# Patient Record
Sex: MALE | Race: BLACK OR AFRICAN AMERICAN
[De-identification: names, ages, dates, MRNs, and addresses within clinical notes are randomized per-mention and may not be internally consistent; named-entity substitution may affect disease eponyms.]

---

## 2017-10-09 ENCOUNTER — HOSPITAL ENCOUNTER (OUTPATIENT)
Dept: PHYSICAL THERAPY | Facility: CLINIC | Age: 52
Setting detail: THERAPIES SERIES
Discharge: HOME | End: 2017-10-09
Payer: COMMERCIAL

## 2017-10-09 PROCEDURE — G0283 ELEC STIM OTHER THAN WOUND: HCPCS

## 2017-10-09 PROCEDURE — 97110 THERAPEUTIC EXERCISES: CPT

## 2017-10-09 PROCEDURE — 97162 PT EVAL MOD COMPLEX 30 MIN: CPT

## 2017-10-09 NOTE — CONSULTS
[] Ginna Levine        Outpatient Physical                Therapy       [address]       Phone: [phone]       Fax: [phone] [x] [facility]       Promotion at [address] [facility]       Phone: [phone]       Fax: [phone] [] Savi Garcia      for Health Promotion    [address]     Phone: [phone]     Fax:  [phone]     Physical Therapy Spine Evaluation    Date:  10/9/2017  Patient: Sergio Leaver  : 1965  MRN: [id number]  Physician: Reilly Case MD  Insurance: [facility]  Medical Diagnosis: cervical,  Thoracic and lumbar strain    Rehab Codes: M54.2, M54.5  Onset Date:  (Lumbar); 3 months ago (cervical)  Next 's appt.: as needed     Subjective:   CC:Pt c/o constant LBP that he rates 3/10; he reports he is currently off work due to the injury. He notes his job requires frequent lifting. HPI: (onset date): Pt is a 45 y.o. Male who reports a 3 year history of constant Lumbar pain; he reports a 3 month history of cervical pain but notes this is intermittent.      PMHx: [] Unremarkable [] Diabetes [x] HTN  [] Pacemaker   [] MI/Heart Problems [] Cancer [] Arthritis [] Other:              [x] Refer to full medical chart  In EPIC   Tests: [] X-Ray: [] MRI:  [] Other:    Medications: [x] Refer to full medical record [] None [] Other:  Allergies:      [x] Refer to full medical record [] None [] Other:    Function:  Hand Dominance  [x] Right  [] Left  Working:  [] Normal Duty  [] Light Duty  [x] Off D/T Condition  [] Retired  [] Not Employed                  []  Disability  [] Other:           Return to work:2017   Job/ADL Description:[employer]  Pain:  [x] Yes  [] No Location: see above Pain Rating: (0-10 scale)  2-3/10  Pain altered Tx:  [] Yes  [] No  Action:  Symptoms:  [x] Improving [] Worsening [] Same  Better:  [] AM    [] PM    [] Sit    [] Rise/Sit    []Stand    [] Walk    [] Lying    [] Other:  Worse: [] AM    [] PM    [] Sit    [] Rise/Sit    [x]Stand    [] Walk    [] Lying    [] Bend                             [] Valsalva    [] Other:  Sleep: [x] OK    [] Disturbed    Objective:      STRENGTH  STRENGTH  ROM    Left Right  Left Right Cervical    C5 Shld Abd   L1-2 Hip Flex 4 5 Flexion Min ? Shld Flexion   Hip Abd 4 5 Extension Min ? Shld IR   L3-4 Knee Ext 4+ 5 Rotation L 25 R 40   Shld ER   L4 Ankle DF 5 5 Sidebend L 25 R 25   C6 Elb Flex   L5 EHL 5 5 Retraction    C7 Elb Ext   S1 Plant. Flex   Lumbar    C8 EPL   Abdominals   Flexion 40° dev to L   T1 Fing Abd   Erector Spinae   Extension 20°         Rotation L  R         Sidebend L 20 R 20         UE/LE                                                                  TESTS (+/-) LEFT RIGHT Not Tested   SLR [] sit [x] supine Neg Neg []   Hamstring (SLR)   []   SKTC   []   DKTC   []   Slump/Dural Neg Neg []   SI JT   []   AV   []   Joint Mobility   []   Cerv. Comp   []   Cerv. Distraction   []   Cerv. Alar/Transverse   []   Vertebral Artery   []   Adsons   []   Fayetta Pipe   []   Leticia Tests ? Pain ?  Pain No Change Not Tested   RFIS [] [] [] [x]   ROSA [] [] [] [x]   RFIL [] [] [] [x]   REIL [] [] [] [x]   Rep Prot [] [] [] []   Rep Retract [] [] [] []       OBSERVATION No Deficit Deficit Not Tested Comments   Posture       Forward Head [] [] []    Rounded Shoulders [] [x] []    Kyphosis [] [] []    Lordosis [] [] []    Lateral Shift [] [x] [] Deviates to Left in flexion   Scoliosis [] [] []    Iliac Crest [] [] []    PSIS [] [] []    ASIS [] [] []    Genu Valgus [] [] []    Genu Varus [] [] []    Genu Recurvatum [] [] []    Pronation [] [] []    Supination [] [] []    Leg Length Discrp [] [] []    Slumped Sitting [] [x] []    Palpation [] [] []    Sensation [] [] []    Edema [] [] []    Neurological [] [] []        FUNCTION Normal Difficult Unable   Sitting [] [] []   Standing [] [] []   Ambulation [] [] []   Groom/Dress [] [] []   Lift/Carry [] [] []   Stairs [] [] []   Bending [] [] []   OH reach [] [] []   Sit to Stand [] [] []     Comments:Oswestry Low back scale: 22/45; slumped sitting posture with forward head; negative dural tension signs    Assessment:  Problems:    [x] ? Back Pain: central LBP with Left buttock/leg pain    [x] ? Cervical Pain:    [x] ? ROM: limited cervical and lumbar ROM    [x] ? Strength: Left LE  [x] ? Function: difficulty with lifting, prolonged standing and walking  [x] Postural Deviations     STG: (to be met in 7 treatments)  1. ? Pain: reduce neck and low back pain  2. ? ROM: improve lumbar flexion to 60°; B rotation to 50°  3. ? Strength:  Left hip musculature and initiate core stabilization program  4. ? Function: improve walking tolerance per Oswestry scale  5. Independent with Home Exercise Programs    LTG: (to be met in 12 treatments)  1. Improve standing tolerance per Oswestry  2. Improve walking tolerance to no pain when walking      Patient goals:weight loss      Rehab Potential:  [x] Good  [] Fair  [] Poor   Suggested Professional Referral:  [x] No  [] Yes:  Barriers to Goal Achievement[de-identified]  [x] No  [] Yes:  Domestic Concerns:  [x] No  [] Yes:    Pt. Education:  [x] Plans/Goals, Risks/Benefits discussed  [] Home exercise program  Method of Education: [] Verbal  [] Demo  [] Written  Comprehension of Education:  [x] Verbalizes understanding. [] Demonstrates understanding. [] Needs Review. [] Demonstrates/verbalizes understanding of HEP/Ed previously given.     Treatment Plan:  [x] Therapeutic Exercise    [] Modalities:  [] Therapeutic Activity    [] Ultrasound  [x] Electrical Stimulation  [] Gait Training     []Massage       [] Lumbar/Cervical Traction  [] Neuromuscular Re-education [x] Cold/hotpack [] Iontophoresis: 4 mg/mL  [x] Instruction in HEP             Dexamethasone Sodium  [] Manual Therapy             Phosphate 40-80 mAmin  [] Aquatic Therapy   [] Other:    []  Medication allergies reviewed for use of    Dexamethasone Sodium Phosphate 4mg/ml     with iontophoresis treatments. Pt is not allergic. Frequency: 2-3 x/week for 12 visits    Todays Treatment:  Modalities: UES to Low back in supine, legs elevated  Precautions:  Exercises:  Exercise Reps/ Time Weight/ Level Comments         lumbar      LTR x20     SKTC x10ea           standing      Hip abd x20ea blue    Hip flexion x20ea blue                      cervical      ACROM      shld shrugs/rolls      pulldowns                                    Other:    Specific Instructions for next treatment:    Treatment Charges: Mins Units   [x] Evaluation       []  Low       [x]  Moderate       []  High 40 1   [x]  Modalities: UES 20 1   [x]  Ther Exercise 15 1   []  Manual Therapy     []  Ther Activities     []  Aquatics     []  Vasocompression     []  Other       TOTAL TREATMENT TIME: 75    Time in: 1:03 pm    Time out: 2:18 p    Electronically signed by: Quinton Saxena PT        Physician Signature:________________________________Date:__________________  By signing above or cosigning this note, I have reviewed this plan of care and certify a need for medically necessary rehabilitation services.      *PLEASE SIGN ABOVE AND FAX BACK ALL PAGES*

## 2017-10-18 ENCOUNTER — HOSPITAL ENCOUNTER (OUTPATIENT)
Dept: PHYSICAL THERAPY | Facility: CLINIC | Age: 52
Setting detail: THERAPIES SERIES
Discharge: HOME | End: 2017-10-18
Payer: COMMERCIAL

## 2017-10-18 PROCEDURE — G0283 ELEC STIM OTHER THAN WOUND: HCPCS

## 2017-10-18 PROCEDURE — 97110 THERAPEUTIC EXERCISES: CPT

## 2017-10-18 NOTE — FLOWSHEET NOTE
[] Sonam Jackson       Outpatient Physical        Therapy       [address]       Phone: [phone]       Fax: [phone] [x] [facility] at [address] [facility]       Phone: [phone]       Fax: [phone] [] Rahel.  [address]   Phone: [phone]   Fax:  [phone]     Physical Therapy Daily Treatment Note    Date:  10/18/2017  Patient Name:  Errol Oliva    :  1965  MRN: [id number]  Physician: Joanna Melara MD                                        Insurance: [facility]  Medical Diagnosis: cervical,  Thoracic and lumbar strain                                     Rehab Codes: M54.2, M54.5  Onset Date:  (Lumbar); 3 months ago (cervical)                                           Next 's appt.: as needed     Visit# / total visits: 2/12 Cancels/No Shows: 0/0    Subjective:    Pain:  [x] Yes  [] No Location: Low back Pain Rating: (0-10 scale) 2-310  Pain altered Tx:  [] No  [] Yes  Action:    Comments: Pt notes relief from use of heat/Estim last session    Objective:  Modalities: UES to Low back in supine, legs elevated  Precautions:  Exercises:  Exercise Reps/ Time Weight/ Level Comments         Nu Step 10min L3                          lumbar         LTR x20       SKTC x10ea       March in place x20ea                     standing         Hip abd x20ea blue     Hip flexion x20ea blue                                   cervical         ACROM  x10 ea  5 sec hold     shld shrugs/rolls  x30  blue     pulldowns  x30  blue                                                       Other:     Specific Instructions for next treatment:      Treatment Charges: Mins Units   [x]  Modalities: HP/UES 20 0/1   [x]  Ther Exercise 28 2   []  Manual Therapy     []  Ther Activities     []  Aquatics     []  Vasocompression     []  Other     Total Treatment time 48 3       Assessment: [x]

## 2017-10-25 ENCOUNTER — HOSPITAL ENCOUNTER (OUTPATIENT)
Dept: PHYSICAL THERAPY | Facility: CLINIC | Age: 52
Setting detail: THERAPIES SERIES
Discharge: HOME | End: 2017-10-25
Payer: COMMERCIAL

## 2017-10-25 PROCEDURE — G0283 ELEC STIM OTHER THAN WOUND: HCPCS

## 2017-10-25 PROCEDURE — 97110 THERAPEUTIC EXERCISES: CPT

## 2017-10-25 NOTE — FLOWSHEET NOTE
[x]  Modalities: HP/UES 20 0/1   [x]  Ther Exercise 28 2   []  Manual Therapy     []  Ther Activities     []  Aquatics     []  Vasocompression     []  Other     Total Treatment time 48 3       Assessment: [x] Progressing toward goals. Pt reports no neck pain and no pain with walking. Pt complains of pain with lifting. [] No change. [] Other:    STG/LTG  STG: (to be met in 7 treatments)  1. ? Pain: reduce neck and low back pain 10/25- progress made 0/10 neck pain   2. ? ROM: improve lumbar flexion to 60°; B rotation to 50°  3. ? Strength:  Left hip musculature and initiate core stabilization program  4. ? Function: improve walking tolerance per Oswestry scale  5. Independent with Home Exercise Programs     LTG: (to be met in 12 treatments)  1. Improve standing tolerance per Oswestry  2. Improve walking tolerance to no pain when walking goal met 10/25        Patient goals:weight loss    Pt. Education:  [x] Yes  [] No  [] Reviewed Prior HEP/Ed  Method of Education: [x] Verbal  [] Demo  [x] Written  Comprehension of Education:  [x] Verbalizes understanding. [] Demonstrates understanding. [] Needs review. [] Demonstrates/verbalizes HEP/Ed previously given. Plan: [x] Continue per plan of care.    [] Other:      Time In: 2:02 p            Time Out: 3:15 p    Electronically signed by:  Declan Sherwood, PT

## 2017-11-01 ENCOUNTER — HOSPITAL ENCOUNTER (OUTPATIENT)
Dept: PHYSICAL THERAPY | Facility: CLINIC | Age: 52
Setting detail: THERAPIES SERIES
Discharge: HOME | End: 2017-11-01
Payer: COMMERCIAL

## 2017-11-01 PROCEDURE — 97110 THERAPEUTIC EXERCISES: CPT

## 2017-11-01 PROCEDURE — G0283 ELEC STIM OTHER THAN WOUND: HCPCS

## 2017-11-01 NOTE — FLOWSHEET NOTE
Other:     Specific Instructions for next treatment:      Treatment Charges: Mins Units   [x]  Modalities: HP/UES 20 0/1   [x]  Ther Exercise 28 2   []  Manual Therapy     []  Ther Activities     []  Aquatics     []  Vasocompression     []  Other     Total Treatment time 48 3       Assessment: [x] Progressing toward goals. Pt needs verbal cues to maintain neutral spine posture during standing program; will add stepping Ex next session    [] No change. [] Other:    STG/LTG  STG: (to be met in 7 treatments)  1. ? Pain: reduce neck and low back pain 10/25- progress made 0/10 neck pain   2. ? ROM: improve lumbar flexion to 60°; B rotation to 50°  3. ? Strength:  Left hip musculature and initiate core stabilization program  4. ? Function: improve walking tolerance per Oswestry scale  5. Independent with Home Exercise Programs     LTG: (to be met in 12 treatments)  1. Improve standing tolerance per Oswestry  2. Improve walking tolerance to no pain when walking goal met 10/25        Patient goals:weight loss    Pt. Education:  [x] Yes  [] No  [] Reviewed Prior HEP/Ed  Method of Education: [x] Verbal  [] Demo  [x] Written  Comprehension of Education:  [x] Verbalizes understanding. [] Demonstrates understanding. [] Needs review. [] Demonstrates/verbalizes HEP/Ed previously given. Plan: [x] Continue per plan of care.    [] Other:      Time In: 1:02 p            Time Out: 2:15  p    Electronically signed by:  Clifford Rodriguez, PT

## 2017-11-08 ENCOUNTER — HOSPITAL ENCOUNTER (OUTPATIENT)
Dept: PHYSICAL THERAPY | Facility: CLINIC | Age: 52
Setting detail: THERAPIES SERIES
Discharge: HOME | End: 2017-11-08
Payer: COMMERCIAL

## 2017-11-08 PROCEDURE — 97110 THERAPEUTIC EXERCISES: CPT

## 2017-11-08 PROCEDURE — G0283 ELEC STIM OTHER THAN WOUND: HCPCS

## 2017-11-15 ENCOUNTER — HOSPITAL ENCOUNTER (OUTPATIENT)
Dept: PHYSICAL THERAPY | Facility: CLINIC | Age: 52
Setting detail: THERAPIES SERIES
Discharge: HOME | End: 2017-11-15
Payer: COMMERCIAL

## 2017-11-15 PROCEDURE — G0283 ELEC STIM OTHER THAN WOUND: HCPCS

## 2017-11-15 PROCEDURE — 97110 THERAPEUTIC EXERCISES: CPT

## 2017-11-15 NOTE — FLOWSHEET NOTE
[] Adriel Canseco       Outpatient Physical        Therapy       [address]       Phone: [phone]       Fax: [phone] [x] [facility] at [address] [facility]       Phone: [phone]       Fax: [phone] [] Rahel. [address] [place] [address] Health Promotion  [address] [place] [address]   Phone: [phone]   Fax:  [phone]     Physical Therapy Daily Treatment Note    Date:  11/15/2017  Patient Name:  Pedro Coles    :  1965  MRN: [id number]  Physician: Diamond Watters MD                                        Insurance: [facility]  Medical Diagnosis: cervical,  Thoracic and lumbar strain                                     Rehab Codes: M54.2, M54.5  Onset Date:  (Lumbar); 3 months ago (cervical)                                           Next 's appt.: as needed     Visit# / total visits:  Cancels/No Shows: 0/0    Subjective:    Pain:  [x] Yes  [] No Location: Low back Pain Rating: (0-10 scale) 2-3 /10  Pain altered Tx:  [] No  [] Yes  Action:     Comments: Pt notes mild B anterior hip pain; states he walked about 30 minutes to get to his appointment today as his car is in the shop.      Objective:  Modalities: UES to Low back in supine with heat, legs elevated  Precautions:  Exercises:  Exercise Reps/ Time Weight/ Level Comments         Nu Step 15min L5                          lumbar         Neutral spine-apt/ppt X     Pelvic tilt-neutral spine   Added 10/25   LTR x20       SKTC x10ea       March in place- neutral spine 2x20ea 3#    SLR- neutral spine 2x20 ea 3# Added 10/25   Alternating UE/LE- neutral spine 2x20 3# LE; 2# UE Added 10/25                   standing         Hip abd x20ea black     Hip flexion x20ea Black     Hip ext   x20 ea Black                         cervical         ACROM  x10 ea  5 sec hold     shld shrugs/rolls  x30  blue     pulldowns  x30  blue                          steps-FW 6 20  next  steps-retro 6 x15ea  next                               Other:     Specific Instructions for next treatment: advance DLS Ex      Treatment Charges: Mins Units   [x]  Modalities: HP/UES 20 0/1   [x]  Ther Exercise 28 2   []  Manual Therapy     []  Ther Activities     []  Aquatics     []  Vasocompression     []  Other     Total Treatment time 50 3       Assessment: [x] Progressing toward goals. Pt noted some L anterior hip pain at completion of closed chain Ex- most likely due to combination of Ex and walking to appointment today. [] No change. [] Other:    STG/LTG  STG: (to be met in 7 treatments)  1. ? Pain: reduce neck and low back pain 10/25- progress made 0/10 neck pain   2. ? ROM: improve lumbar flexion to 60°; B rotation to 50°  3. ? Strength:  Left hip musculature and initiate core stabilization program  4. ? Function: improve walking tolerance per Oswestry scale  5. Independent with Home Exercise Programs     LTG: (to be met in 12 treatments)  1. Improve standing tolerance per Oswestry  2. Improve walking tolerance to no pain when walking goal met 10/25        Patient goals:weight loss    Pt. Education:  [x] Yes  [] No  [] Reviewed Prior HEP/Ed  Method of Education: [x] Verbal  [] Demo  [x] Written  Comprehension of Education:  [x] Verbalizes understanding. [] Demonstrates understanding. [] Needs review. [] Demonstrates/verbalizes HEP/Ed previously given. Plan: [x] Continue per plan of care.    [] Other:      Time In: 1:02 p            Time Out: 2:20  p    Electronically signed by:  Elissa Bermudez, PT

## 2017-11-20 ENCOUNTER — HOSPITAL ENCOUNTER (OUTPATIENT)
Dept: PHYSICAL THERAPY | Facility: CLINIC | Age: 52
Setting detail: THERAPIES SERIES
Discharge: HOME | End: 2017-11-20
Payer: COMMERCIAL

## 2017-11-22 ENCOUNTER — HOSPITAL ENCOUNTER (OUTPATIENT)
Dept: PHYSICAL THERAPY | Facility: CLINIC | Age: 52
Setting detail: THERAPIES SERIES
End: 2017-11-22
Payer: COMMERCIAL

## 2017-11-29 ENCOUNTER — APPOINTMENT (OUTPATIENT)
Dept: PHYSICAL THERAPY | Facility: CLINIC | Age: 52
End: 2017-11-29
Payer: COMMERCIAL

## 2020-03-07 NOTE — FLOWSHEET NOTE
[] Connie Lakhani       Outpatient Physical        Therapy       [address]       Phone: [phone]       Fax: [phone] [x] [facility] at [address] [facility]       Phone: [phone]       Fax: [phone] [] Savi  [facility]  [address] [facility]   Phone: [phone]   Fax:  [phone]     Physical Therapy Daily Treatment Note    Date:  2017  Patient Name:  Lindsay Romero    :  1965  MRN: [id number]  Physician: Cristiane Poon MD                                        Insurance: [facility]  Medical Diagnosis: cervical,  Thoracic and lumbar strain                                     Rehab Codes: M54.2, M54.5  Onset Date:  (Lumbar); 3 months ago (cervical)                                           Next 's appt.: as needed     Visit# / total visits:  Cancels/No Shows: 0/0    Subjective:    Pain:  [x] Yes  [] No Location: Low back Pain Rating: (0-10 scale) 2-3 /10  Pain altered Tx:  [] No  [] Yes  Action:     Comments: Pt notes less stiffness in Low back since starting his physical therapy    Objective:  Modalities: UES to Low back in supine with heat, legs elevated  Precautions:  Exercises:  Exercise Reps/ Time Weight/ Level Comments         Nu Step 12min L4                          lumbar         Neutral spine-apt/ppt X     Pelvic tilt-neutral spine   Added 10/25   LTR x20       SKTC x10ea       March in place- neutral spine 2x20ea 3#    SLR- neutral spine 2x20 ea  Added 10/25   Alternating UE/LE- neutral spine 2x20 3# Added 10/25                   standing         Hip abd x20ea black     Hip flexion x20ea Black     Hip ext   x20 ea Black                         cervical         ACROM  x10 ea  5 sec hold     shld shrugs/rolls  x30  blue     pulldowns  x30  blue                          steps-FW 6 20  next    steps-retro 6 x15ea  next                            
no